# Patient Record
(demographics unavailable — no encounter records)

---

## 2025-04-28 NOTE — HISTORY OF PRESENT ILLNESS
[FreeTextEntry1] : 47 year M with PMHx significant for presents for initial evaluation regarding their ???  pain.   Current treatment:   Location: Quality: Exacerbating Factors: Alleviating Factors: Numbness/tingling: Weakness:   Bowel/bladder dysfunction: Denies   Prior injections:   Prior Treatments:   Pertinent Surgical History:   Anticoagulation:     Patient denies current infection, fevers, or chills. Physician Disclaimer: I have personally reviewed and confirmed all HPI data with the patient.